# Patient Record
Sex: MALE | Race: WHITE | Employment: STUDENT | URBAN - METROPOLITAN AREA
[De-identification: names, ages, dates, MRNs, and addresses within clinical notes are randomized per-mention and may not be internally consistent; named-entity substitution may affect disease eponyms.]

---

## 2021-05-07 ENCOUNTER — MEDICAL CORRESPONDENCE (OUTPATIENT)
Dept: HEALTH INFORMATION MANAGEMENT | Facility: CLINIC | Age: 21
End: 2021-05-07

## 2021-05-07 ENCOUNTER — TRANSFERRED RECORDS (OUTPATIENT)
Dept: HEALTH INFORMATION MANAGEMENT | Facility: CLINIC | Age: 21
End: 2021-05-07

## 2021-05-11 ENCOUNTER — TRANSFERRED RECORDS (OUTPATIENT)
Dept: HEALTH INFORMATION MANAGEMENT | Facility: CLINIC | Age: 21
End: 2021-05-11

## 2021-05-12 ENCOUNTER — TELEPHONE (OUTPATIENT)
Dept: SURGERY | Facility: CLINIC | Age: 21
End: 2021-05-12

## 2021-05-12 ENCOUNTER — TRANSCRIBE ORDERS (OUTPATIENT)
Dept: OTHER | Age: 21
End: 2021-05-12

## 2021-05-12 DIAGNOSIS — L05.91 PILONIDAL CYST: Primary | ICD-10-CM

## 2021-05-12 NOTE — TELEPHONE ENCOUNTER
[facility] Call Center    Phone Message    May a detailed message be left on voicemail: yes     Reason for Call: Appointment Intake    Referring Provider Name: Jenn Bundy NP in GENERIC EXTERNAL DATA DEPARTMENT  Diagnosis and/or Symptoms: Pilonidal cyst     Action Taken: Message routed to:  [facility] Rectal    Travel Screening: Not Applicable

## 2021-05-13 NOTE — TELEPHONE ENCOUNTER
I have called the mobile number x2. It goes directly to  and the mailbox is full. The home number is invalid. Pt does not have MyChart. Will send a letter.

## 2021-05-13 NOTE — TELEPHONE ENCOUNTER
[facility] Call [facility]    Phone Message    May a detailed message be left on voicemail: yes     Reason for Call: Other: Pt is requesting Lilly please try reaching out again, Pt states they were getting a second dose of the vaccine yesterday when she had reached out and Pt was feeling sick this morning and was unable to make it to the phone. Please advise. Thank you!      Action Taken: Message routed to:  [facility]     Travel Screening: Not Applicable

## 2021-05-13 NOTE — TELEPHONE ENCOUNTER
Per pt his cyst spontaneously drained on its own. He is in no pain and would like to discuss surgical internvetion. Will forward to schedulers to set up appt.

## 2021-05-14 ENCOUNTER — TELEPHONE (OUTPATIENT)
Dept: SURGERY | Facility: CLINIC | Age: 21
End: 2021-05-14

## 2021-05-14 NOTE — TELEPHONE ENCOUNTER
[facility] Call [facility]    Phone Message    May a detailed message be left on voicemail: yes     Reason for Call: Appointment Intake    Referring Provider Name: Jenn Bundy NP  Diagnosis and/or Symptoms: Pilonidal cyst    Action Taken: Message routed to:  [facility]    Travel Screening: Not Applicable

## 2021-05-14 NOTE — TELEPHONE ENCOUNTER
Attempted calling pt regarding message below. None of the contact numbers are valid. Attempted calling pt's mom, no answer. Unable to leave VM due to no authorization from pt.    Sharita Treadwell CMA  [facility]  Phone: [phone]

## 2021-05-18 NOTE — TELEPHONE ENCOUNTER
Called pt regarding message below. Pt did not answer the phone. I left a VM with call back number.    Sharita Treadwell CMA

## 2021-05-25 ENCOUNTER — PRE VISIT (OUTPATIENT)
Dept: SURGERY | Facility: CLINIC | Age: 21
End: 2021-05-25

## 2021-05-25 NOTE — TELEPHONE ENCOUNTER
RECORDS RECEIVED FROM: [facility] date: 06/30/2021   NOTES STATUS DETAILS   OFFICE NOTE from referring provider  Recieved 05/13/2021, Jenn Bundy NP from [facility]   OFFICE NOTE from other specialist   N/A    DISCHARGE SUMMARY from hospital  N/A    DISCHARGE REPORT from the ER N/A    OPERATIVE REPORT  N/A    MEDICATION LIST N/A    LABS     PFC TESTING N/A    ANAL PAP N/A    BIOPSIES/PATHOLOGY RELATED TO DIAGNOSIS N/A    DIAGNOSTIC PROCEDURES     COLONOSCOPY N/A    UPPER ENDOSCOPY (EGD) N/A    FLEX SIGMOIDOSCOPY  N/A    ERCP N/A    IMAGING (DISC & REPORT)      CT  N/A    MRI N/A    XRAY N/A    ULTRASOUND (ENDOANAL/ENDORECTAL) N/A      Action 05/25/2021 JTV 2:17PM   Action Taken CSS called patient to get more information on if the patient was seen anywhere else for the Pilonidal cyst in the past. A voicemail was left for patient to call back [phone].      Action 06/23/2021 JTV 2:55pm   Action Taken CSS contacted patient and got verbal consent to collect and update patient medical records. Patient confirmed that he was seen at [facility] for the Pilonidal Cyst. Patient's Care Everywhere is updated. Patient does not have records in Care Everywhere. Request for medical records was sent to [facility].        Action 06/23/2021 JTV 4:34pm   Action Taken FELIPA received medical records from [facility]. \[facility]\"" forwarded records to imaging department for processing.

## 2021-06-27 ENCOUNTER — HEALTH MAINTENANCE LETTER (OUTPATIENT)
Age: 21
End: 2021-06-27

## 2021-06-28 NOTE — PROGRESS NOTES
Colon and Rectal Surgery Consult Clinic Note    Referring provider:  Jenn Bundy NP  [address] [address]       RE: Sumanth Alfaro  : 2000  SHABBIR: 2021      Cathleen Alfaro is a very pleasant 21 year old transgender male with visit today for pilonidal cyst.    HISTORY OF PRESENT ILNESS:  Cathleen first developed a pilonidal abscess over two years ago and this required I&D and antibiotics. He did well until it recurred about a month and a half ago and he was was seen at [facility] on 21 with spontaneously drained abscess. He is now asymptomatic. No pain or drainage. He is otherwise healthy. He does not smoke. 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    PLEASE SEE NOTE BELOW FOR PHYSICAL EXAMINATION, REVIEW OF SYSTEMS, AND OTHER HISTORY.    Assessment/Plan: 21 year old male with recurrent pilonidal abscess. On exam he has fairly heavy hair burden and only 1-2 pilonidal pits present. There is some mild induration to the left of the trish cleft but no severe pilonidal disease. The pilonidal cyst is well away from the anus and appears to be contained.    We discussed laser hair removal to try to decrease incidence of recurrent abscess.     We also discussed surgical intervention with pilonidal cystectomy with flap closure.     Discussed risks of healing, recurrence, and activity restrictions follow in surgery.     He would like to think more about timing for this and will contact our clinic if he wants to proceed with surgical intervention.     We would ideally like to do this when he does not have an active infection. If he develops a recurrent infection in the interim, would like him to return to clinic and would manage that before surgical intervention.     Patient's questions were answered to his stated satisfaction and he is in agreement with this plan.    25 minutes spent on the date of the encounter doing chart review,  history and exam, documentation and further activities as noted above.     Myla Mckeon MD  Colon and Rectal Surgery Staff  [facility]      -------------------------------------------------------------------------------------------------------------------          Medical history:  No past medical history on file.    Surgical history:  No past surgical history on file.    Problem list:  There are no active problems to display for this patient.      Medications:  Current Outpatient Medications   Medication Sig Dispense Refill     testosterone (ANDROGEL) 25 MG/2.5GM (1%) gel          Allergies:  No Known Allergies    Family history:  No family history on file.    Social history:  Social History     Socioeconomic History     Marital status: Single     Spouse name: Not on file     Number of children: Not on file     Years of education: Not on file     Highest education level: Not on file   Occupational History     Not on file   Social Needs     Financial resource strain: Not on file     Food insecurity     Worry: Not on file     Inability: Not on file     Transportation needs     Medical: Not on file     Non-medical: Not on file   Tobacco Use     Smoking status: Never Smoker     Smokeless tobacco: Never Used   Substance and Sexual Activity     Alcohol use: Never     Frequency: Never     Binge frequency: Never     Drug use: Never     Sexual activity: Not on file   Lifestyle     Physical activity     Days per week: Not on file     Minutes per session: Not on file     Stress: Not on file   Relationships     Social connections     Talks on phone: Not on file     Gets together: Not on file     Attends Hinduism service: Not on file     Active member of club or organization: Not on file     Attends meetings of clubs or organizations: Not on file     Relationship status: Not on file     Intimate partner violence     Fear of current or ex partner: Not on file     Emotionally abused: Not on  "file     Physically abused: Not on file     Forced sexual activity: Not on file   Other Topics Concern     Not on file   Social History Narrative     Not on file         Nursing Notes:   Sharita Diallo CMA  2021  4:22 PM  Signed  Chief Complaint   Patient presents with     New Patient     New consult to discuss surgical options for pilonidal cyst       Vitals:    21 1547   BP: 128/81   BP Location: Left arm   Patient Position: Sitting   Cuff Size: Adult Regular   Pulse: 122   Temp: 98.6  F (37  C)   TempSrc: Oral   SpO2: 95%   Weight: 148 lb   Height: 5' 6\"       Body mass index is 23.89 kg/m .          Sharita Treadwell CMA         Physical Examination:  /81 (BP Location: Left arm, Patient Position: Sitting, Cuff Size: Adult Regular)   Pulse 122   Temp 98.6  F (37  C) (Oral)   Ht 5' 6\"   Wt 148 lb   SpO2 95%   BMI 23.89 kg/m    General: alert, oriented, in no acute distress, sitting comfortably  HEENT: mucous membranes moist  Perianal external examination:  Rebecca cleft with 1-2 small pits on the midline. Some mild induration to the left of the midline. No erythema or fluctuance. No drainage.          "

## 2021-06-30 ENCOUNTER — OFFICE VISIT (OUTPATIENT)
Dept: SURGERY | Facility: CLINIC | Age: 21
End: 2021-06-30
Payer: COMMERCIAL

## 2021-06-30 VITALS
HEIGHT: 66 IN | HEART RATE: 122 BPM | DIASTOLIC BLOOD PRESSURE: 81 MMHG | WEIGHT: 148 LBS | OXYGEN SATURATION: 95 % | SYSTOLIC BLOOD PRESSURE: 128 MMHG | BODY MASS INDEX: 23.78 KG/M2 | TEMPERATURE: 98.6 F

## 2021-06-30 DIAGNOSIS — L98.8 PILONIDAL DISEASE: Primary | ICD-10-CM

## 2021-06-30 PROCEDURE — 99202 OFFICE O/P NEW SF 15 MIN: CPT | Performed by: COLON & RECTAL SURGERY

## 2021-06-30 RX ORDER — TESTOSTERONE 25 MG/2.5G
GEL TRANSDERMAL
COMMUNITY
Start: 2016-11-10

## 2021-06-30 SDOH — HEALTH STABILITY: MENTAL HEALTH: HOW MANY STANDARD DRINKS CONTAINING ALCOHOL DO YOU HAVE ON A TYPICAL DAY?: NOT ASKED

## 2021-06-30 SDOH — HEALTH STABILITY: MENTAL HEALTH: HOW OFTEN DO YOU HAVE A DRINK CONTAINING ALCOHOL?: NEVER

## 2021-06-30 SDOH — HEALTH STABILITY: MENTAL HEALTH: HOW OFTEN DO YOU HAVE 6 OR MORE DRINKS ON ONE OCCASION?: NEVER

## 2021-06-30 ASSESSMENT — PAIN SCALES - GENERAL: PAINLEVEL: MILD PAIN (3)

## 2021-06-30 ASSESSMENT — ENCOUNTER SYMPTOMS
JOINT SWELLING: 0
INSOMNIA: 0
BACK PAIN: 1
PANIC: 0
NERVOUS/ANXIOUS: 1
MUSCLE WEAKNESS: 0
STIFFNESS: 0
MUSCLE CRAMPS: 0
DECREASED CONCENTRATION: 0
DEPRESSION: 1
NECK PAIN: 1
ARTHRALGIAS: 0
MYALGIAS: 1

## 2021-06-30 ASSESSMENT — MIFFLIN-ST. JEOR: SCORE: 1619.07

## 2021-06-30 NOTE — PATIENT INSTRUCTIONS
Follow up:    Please call with any questions or concerns regarding your clinic visit today.    It is a pleasure being involved in your health care.    Contacts post-consultation depending on your need:    Radiology Appointments [phone]    Schedule Clinic Appointments [phone] # 1   M-F 7:30 - 5 pm    KARYN Carr [phone]    Clinic Fax Number [phone]    Surgery Scheduling [phone]    My Chart is available 24 hours a day and is a secure way to access your records and communicate with your care team.  I strongly recommend signing up if you haven't already done so, if you are comfortable with computers.  If you would like to inquire about this or are having problems with My Chart access, you may call [phone] or go online at [email or web address].  Please allow at least 24 hours for a response and extra time on weekends and Holidays.

## 2021-06-30 NOTE — LETTER
2021       RE: Sumanth Alfaro  [address]  [address]  [place] [address]     Dear Colleague,    Thank you for referring your patient, Sumanth Alfaro, to the [facility] [place] [facility]. Please see a copy of my visit note below.    Colon and Rectal Surgery Consult Clinic Note    Referring provider:  Jenn Bundy NP  [place] [phone]       RE: Sumanth Alfaro  : 2000  SHABBIR: 2021      Cathleen Alfaro is a very pleasant 21 year old transgender male with visit today for pilonidal cyst.    HISTORY OF PRESENT ILNESS:  Cathleen first developed a pilonidal abscess over two years ago and this required I&D and antibiotics. He did well until it recurred about a month and a half ago and he was was seen at [facility] on 21 with spontaneously drained abscess. He is now asymptomatic. No pain or drainage. He is otherwise healthy. He does not smoke. 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    PLEASE SEE NOTE BELOW FOR PHYSICAL EXAMINATION, REVIEW OF SYSTEMS, AND OTHER HISTORY.    Assessment/Plan: 21 year old male with recurrent pilonidal abscess. On exam he has fairly heavy hair burden and only 1-2 pilonidal pits present. There is some mild induration to the left of the trish cleft but no severe pilonidal disease. The pilonidal cyst is well away from the anus and appears to be contained.    We discussed laser hair removal to try to decrease incidence of recurrent abscess.     We also discussed surgical intervention with pilonidal cystectomy with flap closure.     Discussed risks of healing, recurrence, and activity restrictions follow in surgery.     He would like to think more about timing for this and will contact our clinic if he wants to proceed with surgical intervention.     We would ideally like to do this when he does not have an  active infection. If he develops a recurrent infection in the interim, would like him to return to clinic and would manage that before surgical intervention.     Patient's questions were answered to his stated satisfaction and he is in agreement with this plan.    25 minutes spent on the date of the encounter doing chart review, history and exam, documentation and further activities as noted above.     Myla Mckeon MD  Colon and Rectal Surgery Staff  [facility]      -------------------------------------------------------------------------------------------------------------------          Medical history:  No past medical history on file.    Surgical history:  No past surgical history on file.    Problem list:  There are no active problems to display for this patient.      Medications:  Current Outpatient Medications   Medication Sig Dispense Refill     testosterone (ANDROGEL) 25 MG/2.5GM (1%) gel          Allergies:  No Known Allergies    Family history:  No family history on file.    Social history:  Social History     Socioeconomic History     Marital status: Single     Spouse name: Not on file     Number of children: Not on file     Years of education: Not on file     Highest education level: Not on file   Occupational History     Not on file   Social Needs     Financial resource strain: Not on file     Food insecurity     Worry: Not on file     Inability: Not on file     Transportation needs     Medical: Not on file     Non-medical: Not on file   Tobacco Use     Smoking status: Never Smoker     Smokeless tobacco: Never Used   Substance and Sexual Activity     Alcohol use: Never     Frequency: Never     Binge frequency: Never     Drug use: Never     Sexual activity: Not on file   Lifestyle     Physical activity     Days per week: Not on file     Minutes per session: Not on file     Stress: Not on file   Relationships     Social connections     Talks on phone: Not on file     " Gets together: Not on file     Attends Yarsani service: Not on file     Active member of club or organization: Not on file     Attends meetings of clubs or organizations: Not on file     Relationship status: Not on file     Intimate partner violence     Fear of current or ex partner: Not on file     Emotionally abused: Not on file     Physically abused: Not on file     Forced sexual activity: Not on file   Other Topics Concern     Not on file   Social History Narrative     Not on file         Nursing Notes:   Sharita Diallo CMA  2021  4:22 PM  Signed  Chief Complaint   Patient presents with     New Patient     New consult to discuss surgical options for pilonidal cyst       Vitals:    21 1547   BP: 128/81   BP Location: Left arm   Patient Position: Sitting   Cuff Size: Adult Regular   Pulse: 122   Temp: 98.6  F (37  C)   TempSrc: Oral   SpO2: 95%   Weight: 148 lb   Height: 5' 6\"       Body mass index is 23.89 kg/m .          Sharita Treadwell CMA         Physical Examination:  /81 (BP Location: Left arm, Patient Position: Sitting, Cuff Size: Adult Regular)   Pulse 122   Temp 98.6  F (37  C) (Oral)   Ht 5' 6\"   Wt 148 lb   SpO2 95%   BMI 23.89 kg/m    General: alert, oriented, in no acute distress, sitting comfortably  HEENT: mucous membranes moist  Perianal external examination:   cleft with 1-2 small pits on the midline. Some mild induration to the left of the midline. No erythema or fluctuance. No drainage.              Again, thank you for allowing me to participate in the care of your patient.      Sincerely,    Myla Mckeon MD      "

## 2021-06-30 NOTE — NURSING NOTE
"Chief Complaint   Patient presents with     New Patient     New consult to discuss surgical options for pilonidal cyst       Vitals:    06/30/21 1547   BP: 128/81   BP Location: Left arm   Patient Position: Sitting   Cuff Size: Adult Regular   Pulse: 122   Temp: 98.6  F (37  C)   TempSrc: Oral   SpO2: 95%   Weight: 148 lb   Height: 5' 6\"       Body mass index is 23.89 kg/m .          Sharita Treadwell CMA    "

## 2021-10-17 ENCOUNTER — HEALTH MAINTENANCE LETTER (OUTPATIENT)
Age: 21
End: 2021-10-17

## 2022-07-24 ENCOUNTER — HEALTH MAINTENANCE LETTER (OUTPATIENT)
Age: 22
End: 2022-07-24

## 2022-10-03 ENCOUNTER — HEALTH MAINTENANCE LETTER (OUTPATIENT)
Age: 22
End: 2022-10-03

## 2023-08-12 ENCOUNTER — HEALTH MAINTENANCE LETTER (OUTPATIENT)
Age: 23
End: 2023-08-12